# Patient Record
Sex: MALE | Race: OTHER | HISPANIC OR LATINO
[De-identification: names, ages, dates, MRNs, and addresses within clinical notes are randomized per-mention and may not be internally consistent; named-entity substitution may affect disease eponyms.]

---

## 2019-09-12 ENCOUNTER — TRANSCRIPTION ENCOUNTER (OUTPATIENT)
Age: 19
End: 2019-09-12

## 2019-09-18 ENCOUNTER — TRANSCRIPTION ENCOUNTER (OUTPATIENT)
Age: 19
End: 2019-09-18

## 2020-04-26 ENCOUNTER — MESSAGE (OUTPATIENT)
Age: 20
End: 2020-04-26

## 2020-05-02 LAB
SARS-COV-2 IGG SERPL IA-ACNC: <0.1 INDEX
SARS-COV-2 IGG SERPL QL IA: NEGATIVE

## 2022-04-11 PROBLEM — Z00.00 ENCOUNTER FOR PREVENTIVE HEALTH EXAMINATION: Status: ACTIVE | Noted: 2022-04-11

## 2022-04-13 ENCOUNTER — APPOINTMENT (OUTPATIENT)
Dept: PHYSICAL MEDICINE AND REHAB | Facility: CLINIC | Age: 22
End: 2022-04-13
Payer: OTHER MISCELLANEOUS

## 2022-04-13 VITALS — DIASTOLIC BLOOD PRESSURE: 82 MMHG | HEART RATE: 74 BPM | SYSTOLIC BLOOD PRESSURE: 136 MMHG

## 2022-04-13 DIAGNOSIS — Z78.9 OTHER SPECIFIED HEALTH STATUS: ICD-10-CM

## 2022-04-13 PROCEDURE — 99072 ADDL SUPL MATRL&STAF TM PHE: CPT

## 2022-04-13 PROCEDURE — 99203 OFFICE O/P NEW LOW 30 MIN: CPT

## 2022-04-13 RX ORDER — MELOXICAM 7.5 MG/1
7.5 TABLET ORAL
Qty: 28 | Refills: 0 | Status: ACTIVE | COMMUNITY
Start: 2022-04-13 | End: 1900-01-01

## 2022-04-13 NOTE — PHYSICAL EXAM
[FreeTextEntry1] : PE:\par Constitutional: In NAD, calm and cooperative\par MSK (Back)\par 	Inspection: no gross swelling identified\par 	Palpation: No tenderness of the bilateral lower lumbar paraspinals\par 	ROM: Mild pain at end lumbar extension/flexion but AROM is 75 degrees flexion and 15 degrees extension\par 	Strength: 5/5 strength in bilateral lower extremities\par 	Reflexes: 2+ Patella reflex bilaterally, 2+ Achilles reflex bilaterally, negative clonus bilaterally\par 	Sensation: Intact to light touch in bilateral lower extremities\par Special tests:\par SLR:negative bilaterally\par VANDANA:negative bilaterally\par FADIR: negative bilaterally\par Facet loading: positive on left, negative on right

## 2022-04-13 NOTE — ASSESSMENT
[FreeTextEntry1] : Mr. Jc Dickson is a 22 year old male who presents with acute low back pain, likely myofascial in nature, less likely discogenic, after moving a patient at work. Denies any red flag signs. Will recommend:\par - Mobic 7.5mg BID x 1 week, and then PRN thereafter. Patient advised on cardiac/gi/renal side effects. Patient encouraged to take medication with food and not with other NSAIDs. \par - PT 2-3x/week for stretching, strengthening (especially of core muscles), ROM exercises, HEP and modalities PRN including myofascial release, moist heat, and TENS therapy \par - Will defer imaging for now given normal neuro exam\par \par RTC in 4 weeks or sooner if needed. If pain persists, could consider imaging at that time. Patient aware of red flag signs including any changes to their bowel/bladder control, groin numbness or new weakness. Patient knows to seek immediate attention by calling 911 or going to nearest ER if these symptoms appear.

## 2022-04-13 NOTE — HISTORY OF PRESENT ILLNESS
[FreeTextEntry1] : Mr. Jc Dickson is a 22 year old male who presents with back pain. \par \par Location:Across low back into midback and upper back\par Onset:Was moving patient from stretcher to bed on 4/6/22, was pulling patient, felt pain across low back which traveled up to his midback after a few steps. He continued to work at that time but didn't work for 5 days to try to rest but then returned to work as he noticed slight improvement. \par Provocation/Palliative:Worse with bending over and lifting, as well as prolonged walking, better at rest\par Quality: tight, sharp\par Radiation:None\par Severity:4/10\par Timing:Improving somewhat over time\par \par Denies any associated numbness. Denies any associated leg weakness. Denies any loss of bowel/bladder control or any groin numbness.\par Previous medications trialed:Bengay \par Previous procedures relevant to complaint:None\par Conservative therapy tried?:None\par  \par Patient works as patient transporter. Last worked yesterday, still having pain however, although he says he is able to work.

## 2022-05-31 ENCOUNTER — APPOINTMENT (OUTPATIENT)
Dept: PHYSICAL MEDICINE AND REHAB | Facility: CLINIC | Age: 22
End: 2022-05-31
Payer: OTHER MISCELLANEOUS

## 2022-05-31 VITALS
WEIGHT: 205 LBS | HEART RATE: 68 BPM | DIASTOLIC BLOOD PRESSURE: 82 MMHG | TEMPERATURE: 98.2 F | SYSTOLIC BLOOD PRESSURE: 131 MMHG | HEIGHT: 71 IN | BODY MASS INDEX: 28.7 KG/M2 | OXYGEN SATURATION: 97 %

## 2022-05-31 PROCEDURE — 99072 ADDL SUPL MATRL&STAF TM PHE: CPT

## 2022-05-31 PROCEDURE — 99213 OFFICE O/P EST LOW 20 MIN: CPT

## 2022-05-31 NOTE — HISTORY OF PRESENT ILLNESS
[FreeTextEntry1] : Mr. Jc Dickson is a 22 year old  male who presents for follow up. At last visit, he was given Mobic, started on PT and told to consider imaging at next visit. He has been overall doing better, although he has still has episodes of pain. 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 He is only occasionally taking Mobic. He has not done PT but has been doing a HEP at the gym since last visit. \par \par Location:Across low back\par Onset:Was moving patient from stretcher to bed on 4/6/22, was pulling patient, felt pain across low back which traveled up to his midback after a few steps. He continued to work at that time but didn't work for 5 days to try to rest but then returned to work as he noticed slight improvement. \par Provocation/Palliative:Worse with bending over and lifting, as well as prolonged walking, better at rest\par Quality: tight, sharp, soreness\par Radiation:None\par Severity:4/10\par Timing:Improving somewhat over time\par \par No bowel/bladder changes. No groin numbness.

## 2022-05-31 NOTE — ASSESSMENT
[FreeTextEntry1] : Mr. Jc Dickson is a 22 year old male who presents with acute low back pain, likely myofascial in nature, less likely discogenic, after moving a patient at work. Although pain has improved it has been persistent.  Denies any red flag signs. Will recommend:\par - Given persistent pain despite conservative treatment with NSAIDs and 6 weeks of a HEP, will order an MRI L Spine to evaluate for underlying pathology. \par - Continue Mobic 7.5mg QDPRN thereafter. Patient advised on cardiac/gi/renal side effects. Patient encouraged to take medication with food and not with other NSAIDs. \par - He will continue HEP for now, not interested in PT\par \par RTC after MRI. If pain persists, could consider imaging at that time. Patient aware of red flag signs including any changes to their bowel/bladder control, groin numbness or new weakness. Patient knows to seek immediate attention by calling 911 or going to nearest ER if these symptoms appear.

## 2022-05-31 NOTE — PHYSICAL EXAM
[FreeTextEntry1] : PE:\par Constitutional: In NAD, calm and cooperative\par MSK (Back)\par 	Inspection: no gross swelling identified\par 	Palpation: No tenderness of the bilateral lower lumbar paraspinals\par 	ROM: Mild pain at end lumbar flexion>extension but AROM is 75 degrees flexion and 15 degrees extension\par 	Strength: 5/5 strength in bilateral lower extremities\par 	Reflexes: 2+ Patella reflex bilaterally, 2+ Achilles reflex bilaterally, negative clonus bilaterally\par 	Sensation: Intact to light touch in bilateral lower extremities\par Special tests:\par SLR:negative bilaterally\par VANDANA:negative bilaterally\par FADIR: negative bilaterally\par Facet loading: positive on left, negative on right

## 2022-06-01 ENCOUNTER — FORM ENCOUNTER (OUTPATIENT)
Age: 22
End: 2022-06-01

## 2022-06-14 ENCOUNTER — APPOINTMENT (OUTPATIENT)
Dept: PHYSICAL MEDICINE AND REHAB | Facility: CLINIC | Age: 22
End: 2022-06-14
Payer: OTHER MISCELLANEOUS

## 2022-06-14 VITALS
HEIGHT: 71 IN | WEIGHT: 225 LBS | HEART RATE: 61 BPM | SYSTOLIC BLOOD PRESSURE: 125 MMHG | OXYGEN SATURATION: 97 % | TEMPERATURE: 98 F | DIASTOLIC BLOOD PRESSURE: 80 MMHG | BODY MASS INDEX: 31.5 KG/M2

## 2022-06-14 DIAGNOSIS — M54.50 LOW BACK PAIN, UNSPECIFIED: ICD-10-CM

## 2022-06-14 DIAGNOSIS — M54.9 DORSALGIA, UNSPECIFIED: ICD-10-CM

## 2022-06-14 DIAGNOSIS — M79.18 MYALGIA, OTHER SITE: ICD-10-CM

## 2022-06-14 PROCEDURE — 99213 OFFICE O/P EST LOW 20 MIN: CPT

## 2022-06-14 PROCEDURE — 99072 ADDL SUPL MATRL&STAF TM PHE: CPT

## 2022-06-14 NOTE — HISTORY OF PRESENT ILLNESS
[FreeTextEntry1] : Mr. Jc Dickson is a 22 year old  male who presents for follow up. At last visit, he was ordered an MRI L Spine, continued on Mobic and HEP. Since then he has been much better. Denies any pain at this time. Only occasionally with certain maneuvers. Denies any new symptoms. \par \par Previous pain:\par \par Location:Across low back\par Onset:Was moving patient from stretcher to bed on 4/6/22, was pulling patient, felt pain across low back which traveled up to his midback after a few steps. He continued to work at that time but didn't work for 5 days to try to rest but then returned to work as he noticed slight improvement. \par Provocation/Palliative:Worse with bending over and lifting, as well as prolonged walking, better at rest\par Quality: tight, sharp, soreness\par Radiation:None\par Severity:4/10\par Timing:Improving\par \par No bowel/bladder changes. No groin numbness.

## 2022-06-14 NOTE — DATA REVIEWED
[FreeTextEntry1] : PATIENT NAME: Jc Dickson\par PATIENT PHONE NUMBER:\par PATIENT ID: [id number]\par : 2000\par DATE OF EXAM: 2022\par R. Phys. Name: Lavon Langston\par R. Phys. Address: [address] [place] [address]\par R. Phys. Phone: [phone]\par MRI-LUMBAR SPINE NON CONTRAST\par \par HISTORY: Lower back pain. Status post injury in 2022.\par \par TECHNIQUE: MRI of the lumbar spine was performed in a 3.0 Daphne ultra high field\par magnetic resonance imaging unit with sagittal T1-weighted, T2-weighted and STIR\par sequences, as well as axial proton density images through the lumbar disc\par spaces.\par \par COMPARISON: No prior studies available for comparison.\par \par FINDINGS:\par Sagittal images demonstrate straightening of the normal lumbar lordosis.\par \par Vertebral height and marrow signal are normal.\par \par There is mild T2 disc signal loss at L5-S1, associated with mild disc space\par narrowing, which reflects early disc desiccation. The remainder of the lumbar\par disc spaces maintain normal height and signal intensity characteristics.\par \par There is no evidence of disc bulge or herniation from T12-L1 through L4-L5.\par \par At L5-S1, there is right foraminal herniation of the disc, with right foraminal\par encroachment.\par \par The facet joints demonstrate normal alignment. There is no significant facet\par joint arthropathy in the lumbar region. There is edema of the interspinous\par spaces at L3-L4, L4-L5 and L5-S1, which is secondary to injury of the\par interspinous ligaments at these levels.\par \par There is no evidence of central spinal stenosis. The neural foramina are\par bilaterally patent, with the exception of the right foramen at L5-S1, as\par described above.\par \par The conus medullaris is normal in position and appearance.\par \par There are no prevertebral or paraspinal abnormalities.\par \par IMPRESSION:\par \par 1. Right foraminal disc herniation at L5-S1.\par 2. Injury of the interspinous ligaments, L3-L4 through L5-S1.\par 3. Lumbar straightening, which may be related to muscle spasm/pain.\par \par Signed by: Rivera Vasquez MD\par Signed Date: 2022 10:59 AM EDT\par \par SIGNED BY: Rivera Vasquez M.D., Ext. [phone] 10:59 AM

## 2022-06-14 NOTE — PHYSICAL EXAM
[FreeTextEntry1] : PE:\par Constitutional: In NAD, calm and cooperative\par MSK (Back)\par 	Inspection: no gross swelling identified\par 	Palpation: No tenderness of the bilateral lower lumbar paraspinals\par 	ROM: No pain at end lumbar extension/flexion\par 	Strength: 5/5 strength in bilateral lower extremities\par 	Reflexes: 2+ Patella reflex bilaterally, 2+ Achilles reflex bilaterally, negative clonus bilaterally\par 	Sensation: Intact to light touch in bilateral lower extremities\par Special tests:\par SLR:negative bilaterally

## 2022-06-23 ENCOUNTER — FORM ENCOUNTER (OUTPATIENT)
Age: 22
End: 2022-06-23

## 2022-11-22 ENCOUNTER — FORM ENCOUNTER (OUTPATIENT)
Age: 22
End: 2022-11-22

## 2022-11-22 ENCOUNTER — APPOINTMENT (OUTPATIENT)
Dept: ORTHOPEDIC SURGERY | Facility: CLINIC | Age: 22
End: 2022-11-22

## 2022-11-22 VITALS
HEIGHT: 71 IN | SYSTOLIC BLOOD PRESSURE: 109 MMHG | WEIGHT: 215 LBS | HEART RATE: 68 BPM | BODY MASS INDEX: 30.1 KG/M2 | DIASTOLIC BLOOD PRESSURE: 73 MMHG | TEMPERATURE: 98.2 F

## 2022-11-22 PROCEDURE — 73110 X-RAY EXAM OF WRIST: CPT | Mod: LT

## 2022-11-22 PROCEDURE — 99203 OFFICE O/P NEW LOW 30 MIN: CPT

## 2022-11-22 PROCEDURE — 99072 ADDL SUPL MATRL&STAF TM PHE: CPT

## 2022-11-22 NOTE — DISCUSSION/SUMMARY
[de-identified] : The patient has sustained an injury to his left wrist.  By mechanism this is likely due to have been a soft tissue injury.  There is some suggestion that there may have been a bony injury at some point.  The patient will be sent for an MRI for further evaluation of this injury.  He is referred for hand therapy to restore range of motion and strengthening.  He will return following the MRI.

## 2022-11-22 NOTE — PHYSICAL EXAM
[Rad] : radial 2+ and symmetric bilaterally [Normal] : Alert and in no acute distress [Poor Appearance] : well-appearing [Acute Distress] : not in acute distress [Obese] : not obese [de-identified] : The patient has no respiratory distress. Mood and affect are normal. The patient is alert and oriented to person, place and time.\par Examination of the cervical spine demonstrates no tenderness, no deformity and no muscle spasm. Cervical spine rotation is 60° to the right, 60° to the left, 75° of extension and 45° of flexion. Neurologic exam of the upper extremities reveals intact sensation to light touch. Motor function is 5 over 5 in all groups. Deep tendon reflexes are 2+ and equal at the biceps, triceps and brachioradialis.\par Examination of the left shoulder demonstrates no swelling, no deformity and no tenderness. The shoulder is stable. Drop arm test is negative. Oscoda test is negative. Liftoff test is negative. Motor strength is 5 over 5 in all groups. Range of motion is full and identical to that of the right shoulder. Flexion is 160°, abduction 160°, external rotation 45° and internal rotation to the lower thoracic level.  The elbows are stable and nontender.  Examination of the left wrist and hand demonstrates tenderness of the distal radius and distal ulna.  He has painful and limited rotation of the left forearm.  He has left wrist dorsiflexion of 90 degrees compared to 65 on the right.  He has volar flexion of 45 compared to 60 on the right.  Tendon function is intact throughout.  The skin is intact.  There is no lymphedema. [de-identified] : AP, lateral and oblique x-rays of the left hand taken today demonstrate evidence of ulnar styloid fracture and possible evidence of prior distal radius fracture with dorsal angulation.

## 2022-11-22 NOTE — HISTORY OF PRESENT ILLNESS
[de-identified] : 22 year old RHD male  presents for initial evaluation of left wrist pain since 09/12/22. 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 He did not seek any medical attention. He has had increasingly constant sharp and shooting pain in his left wrist worse with any movements of the wrist. He feels clicking with supination. He reports that the past several days he has had pain into the 3rd-5th digits. He feels pain improves with rest, Aleve and heat. Reports an episode of paresthesias along the ulnar aspect of his forearm and wrist. Denies prior injury.

## 2022-11-22 NOTE — REASON FOR VISIT
[Initial Visit] : an initial visit for [Workers' Comp: Date of Injury: _______] : This visit is related to worker's compensation. Date of Injury: [unfilled] [FreeTextEntry2] : left wrist pain

## 2022-12-07 ENCOUNTER — FORM ENCOUNTER (OUTPATIENT)
Age: 22
End: 2022-12-07

## 2023-01-26 ENCOUNTER — APPOINTMENT (OUTPATIENT)
Dept: ORTHOPEDIC SURGERY | Facility: CLINIC | Age: 23
End: 2023-01-26
Payer: OTHER MISCELLANEOUS

## 2023-01-26 PROCEDURE — 99072 ADDL SUPL MATRL&STAF TM PHE: CPT

## 2023-01-26 PROCEDURE — 99214 OFFICE O/P EST MOD 30 MIN: CPT | Mod: 25

## 2023-01-26 PROCEDURE — 73110 X-RAY EXAM OF WRIST: CPT | Mod: LT

## 2023-01-26 PROCEDURE — 29065 APPL CST SHO TO HAND LNG ARM: CPT | Mod: LT

## 2023-01-26 NOTE — PHYSICAL EXAM
[de-identified] : Patient is WDWN, alert, and in no acute distress. Breathing is unlabored. He is grossly oriented to person, place, and time.\par \par Left Wrist:\par No joint stability\par Mild tenderness noted over the anatomical snuffbox\par He has full wrist flexion and extension.\par Full pronation.\par End range supination is limited and he has pain terminally.\par Digital motion is full.\par Sensation is intact to the digits distally. [de-identified] : AP, lateral and oblique views of the LEFT wrist were obtained today and revealed a chronic chip fracture at the tip of the ulnar styloid. There is dorsal angulation of the distal radius. No obvious dislocation of the DRUJ seen. \par \par ------------------------------------------------------------------------------------------------------------------------------------------------------------------------------------ \par \par DATE OF EXAM: 12-\par EXAM:  MRI LEFT WRIST WITHOUT CONTRAST\par IMPRESSION: \par 1.  Healing nondisplaced fracture at the base of the ulnar styloid. Chronic likely fracture fragments distal to the tip of the ulnar styloid.\par 2.  No evidence of an acute or healing distal radial fracture. Dorsal tilt of the distal radial articular surface suggests an old healed fracture however. Clinical correlation suggested.\par 3.  Small DRUJ effusion. Mild dorsal subluxation of the ulna. No visible radioulnar ligament tear. Peripheral TFCC not well visualized related to chronic ossicles and edema.\par  \par Duncan Aguilar MD  - Electronically Signed: 12- 10:45 AM

## 2023-01-26 NOTE — RETURN TO WORK/SCHOOL
[FreeTextEntry1] : Mr. RADHA BOONE was seen in the office today on 01/26/2023 and evaluated by me for an Orthopedic visit. Please be advised that he will remain out of work until further notice.  [FreeTextEntry2] : Dr. Matthew Villela M.D. on 01/26/2023.

## 2023-01-26 NOTE — REASON FOR VISIT
[Initial Visit] : an initial visit for [Workers' Comp: Date of Injury: _______] : This visit is related to worker's compensation. Date of Injury: [unfilled] [FreeTextEntry2] : Left wrist

## 2023-01-26 NOTE — ADDENDUM
[FreeTextEntry1] : I, Rehana Chapman wrote this note acting as a scribe for Dr. Matthew Villela on Jan 26, 2023.  4

## 2023-01-26 NOTE — DISCUSSION/SUMMARY
[de-identified] : The underlying pathophysiology was reviewed with the patient. XR films were reviewed with the patient. Discussed at length the nature of the patient’s condition. The left wrist symptoms appear secondary to DRUJ instability.\par \par At this time, given his persistent pain and complaints and as he has not been properly immobilized since the injury occurred, I recommended immobilization with a long arm cast.\par He was placed into a left long arm [place] cast in the office today on 1/26/23. I recommended 6 weeks of cast immobilization.\par Proper cast care instructions were reviewed.\par He was instructed on ROM exercises of the digits and shoulder while casted.\par \par With regard to work, he will remain out of work until further notice. He was provided with the appropriate note. \par \par All questions answered, understanding verbalized. Patient in agreement with plan of care. Follow up in 6 weeks for cast removal and repeat xrays. He will follow up before then if he has any issues or concerns prior.

## 2023-01-26 NOTE — HISTORY OF PRESENT ILLNESS
[Yes] : The patient is currently working. [de-identified] : 23 year old RHD male,  at [employer], presents for evaluation of left wrist pain since injury on 09/12/22. 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 As a result he felt immediate albeit mild pain and a "pop" in his left wrist. He reports he has been experiencing pain in his left wrist since then. He did not initially get treatment as he states the pain initially was not that bad but persisted. He was seen by Dr. Zelaya in November. Movement worsens the pain. He states he has been doing PT 2x/week for about 1 month, which has helped a little. However, he reports some pain still remains; pain is localized to the ulnar, volar aspects, and more mild in the dorsal aspect of left wrist. He states he is not able to fully supinate yet. He states as it it feels the wrist is subluxing when he moves. \par \par He is still currently working at [employer].  [FreeTextEntry2] : He works in security at [employer].

## 2023-01-26 NOTE — END OF VISIT
[FreeTextEntry3] : All medical record entries made by the Scribe were at my,  Dr. Matthew Villela MD., direction and personally dictated by me on 01/26/2023. I have personally reviewed the chart and agree that the record accurately reflects my personal performance of the history, physical exam, assessment and plan.

## 2023-01-30 ENCOUNTER — APPOINTMENT (OUTPATIENT)
Dept: ORTHOPEDIC SURGERY | Facility: CLINIC | Age: 23
End: 2023-01-30
Payer: OTHER MISCELLANEOUS

## 2023-01-30 PROCEDURE — 99072 ADDL SUPL MATRL&STAF TM PHE: CPT

## 2023-01-30 PROCEDURE — 29705 RMVL/BIVLV FULL ARM/LEG CAST: CPT

## 2023-01-30 PROCEDURE — 99214 OFFICE O/P EST MOD 30 MIN: CPT | Mod: 25

## 2023-01-31 ENCOUNTER — TRANSCRIPTION ENCOUNTER (OUTPATIENT)
Age: 23
End: 2023-01-31

## 2023-01-31 NOTE — REASON FOR VISIT
[Follow-Up Visit] : a follow-up visit for [Workers' Comp: Date of Injury: _______] : This visit is related to worker's compensation. Date of Injury: [unfilled] [FreeTextEntry2] : Left wrist

## 2023-01-31 NOTE — ADDENDUM
[FreeTextEntry1] : I, Rehana Chapman wrote this note acting as a scribe for Dr. Matthew Villela on Jan 30, 2023.

## 2023-01-31 NOTE — HISTORY OF PRESENT ILLNESS
[Yes] : The patient is currently working. [de-identified] : 23 year old RHD male,  at [employer], presents for evaluation of left wrist pain since injury on 09/12/22. 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 As a result he felt immediate albeit mild pain and a "pop" in his left wrist. He reports he has been experiencing pain in his left wrist since then. He did not initially get treatment as he states the pain initially was not that bad but persisted. He was seen by Dr. Zelaya in November. Movement worsens the pain. He states he has been doing PT 2x/week for about 1 month, which has helped a little. However, he reports some pain still remains; pain is localized to the ulnar, volar aspects, and more mild in the dorsal aspect of left wrist. He states he is not able to fully supinate yet. He states as it it feels the wrist is subluxing when he moves. He was seen in the office on 1/26/23 at which time he was casted. He returns 4 days later on 1/30/23 due to complaints of discomfort in the cast. He reports numbness volarly along the forearm, which began about 1 to 2 days ago. \par \par He is still currently working at [employer].  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

## 2023-01-31 NOTE — PHYSICAL EXAM
[de-identified] : Patient is WDWN, alert, and in no acute distress. Breathing is unlabored. He is grossly oriented to person, place, and time.\par \par Left Wrist:\par He presents in a left long arm cast.\par The cast was trimmed proximally due to complaints of irritation and numbness medially to the elbow and volarly at the forearm.\par Digital motion is full.\par Sensation is intact to the digits distally. [de-identified] : AP, lateral and oblique views of the LEFT wrist were obtained on 1/26/23 and revealed a chronic chip fracture at the tip of the ulnar styloid. There is dorsal angulation of the distal radius. No obvious dislocation of the DRUJ seen. \par \par ------------------------------------------------------------------------------------------------------------------------------------------------------------------------------------ \par \par DATE OF EXAM: 12-\par EXAM:  MRI LEFT WRIST WITHOUT CONTRAST\par IMPRESSION: \par 1.  Healing nondisplaced fracture at the base of the ulnar styloid. Chronic likely fracture fragments distal to the tip of the ulnar styloid.\par 2.  No evidence of an acute or healing distal radial fracture. Dorsal tilt of the distal radial articular surface suggests an old healed fracture however. Clinical correlation suggested.\par 3.  Small DRUJ effusion. Mild dorsal subluxation of the ulna. No visible radioulnar ligament tear. Peripheral TFCC not well visualized related to chronic ossicles and edema.\par  \par Duncan Aguilar MD  - Electronically Signed: 12- 10:45 AM

## 2023-01-31 NOTE — DISCUSSION/SUMMARY
[de-identified] : The underlying pathophysiology was reviewed with the patient. XR films were reviewed with the patient. Discussed at length the nature of the patient’s condition. The left wrist symptoms appear secondary to DRUJ instability.\par \par At this time, the cast was trimmed and readjusted. He will continue in the cast for another 5 weeks to complete the full 6 weeks of immobilization. He was instructed on activity modification as needed but I did encourage him to use the hand for ADLs to maintain motion at the digits. \par \par All questions answered, understanding verbalized. Patient in agreement with plan of care. Follow up in 5 weeks for cast removal and repeat xrays. 
NEGATIVE

## 2023-01-31 NOTE — END OF VISIT
[FreeTextEntry3] : All medical record entries made by the Scribe were at my,  Dr. Matthew Villela MD., direction and personally dictated by me on 01/30/2023. I have personally reviewed the chart and agree that the record accurately reflects my personal performance of the history, physical exam, assessment and plan.

## 2023-02-02 RX ORDER — IBUPROFEN 600 MG/1
600 TABLET, FILM COATED ORAL
Qty: 60 | Refills: 0 | Status: ACTIVE | COMMUNITY
Start: 2023-02-02 | End: 1900-01-01

## 2023-03-13 ENCOUNTER — APPOINTMENT (OUTPATIENT)
Dept: ORTHOPEDIC SURGERY | Facility: CLINIC | Age: 23
End: 2023-03-13
Payer: OTHER MISCELLANEOUS

## 2023-03-13 PROCEDURE — 99072 ADDL SUPL MATRL&STAF TM PHE: CPT

## 2023-03-13 PROCEDURE — 73130 X-RAY EXAM OF HAND: CPT | Mod: LT

## 2023-03-13 PROCEDURE — 99213 OFFICE O/P EST LOW 20 MIN: CPT | Mod: 25

## 2023-03-13 PROCEDURE — 29705 RMVL/BIVLV FULL ARM/LEG CAST: CPT | Mod: LT

## 2023-03-13 NOTE — DISCUSSION/SUMMARY
[de-identified] : The underlying pathophysiology was reviewed with the patient. XR films were reviewed with the patient. Discussed at length the nature of the patient’s condition. The left wrist symptoms appear secondary to DRUJ instability.\par \par At this time, the left short arm cast was removed in the office today on 3/13/23. He was instructed to soak the hand in warm water and Epsom salts and begin flexion and extension exercises of the wrist. I recommended he begin a course of hand therapy. He was also instructed on use of a wrist brace for support. He will avoid any soft of heavy lifting as well pushing and pulling.\par The patient wishes to proceed with hand therapy of the left wrist. A script was given.\par \par All questions answered, understanding verbalized. Patient in agreement with plan of care. Follow up in 3 weeks for repeat xrays.

## 2023-03-13 NOTE — ADDENDUM
[FreeTextEntry1] : I, Rehana Chapman wrote this note acting as a scribe for Dr. Matthew Villela on Mar 13, 2023.

## 2023-03-13 NOTE — RETURN TO WORK/SCHOOL
[FreeTextEntry1] : Mr. RADHA BOONE was seen in the office today on 03/13/2023 and evaluated by me for an Orthopedic visit. Please be advised that he will remain out of work for the next three weeks and be reassessed at that time in the office.  [FreeTextEntry2] : Dr. Matthew Villela M.D. on 03/13/2023.

## 2023-03-13 NOTE — END OF VISIT
[FreeTextEntry3] : All medical record entries made by the Scribe were at my,  Dr. Matthew Villela MD., direction and personally dictated by me on 03/13/2023. I have personally reviewed the chart and agree that the record accurately reflects my personal performance of the history, physical exam, assessment and plan.

## 2023-03-13 NOTE — PHYSICAL EXAM
[de-identified] : Patient is WDWN, alert, and in no acute distress. Breathing is unlabored. He is grossly oriented to person, place, and time.\par \par Left Wrist:\par He presents in a left long arm cast, which was removed in the office today on 3/13/23.\par No joint stability\par Mild tenderness noted over the anatomical snuffbox\par He has limitations of wrist flexion and extension due to 6 weeks of cast immobilization.\par Full pronation.\par End range supination is limited.\par Digital motion is full.\par Sensation is intact to the digits distally. [de-identified] : AP, lateral and oblique views of the LEFT wrist were obtained today and revealed a chronic chip fracture at the tip of the ulnar styloid. There is dorsal angulation of the distal radius. No obvious dislocation of the DRUJ seen. \par \par ------------------------------------------------------------------------------------------------------------------------------------------------------------------------------------ \par \par DATE OF EXAM: 12-\par EXAM:  MRI LEFT WRIST WITHOUT CONTRAST\par IMPRESSION: \par 1.  Healing nondisplaced fracture at the base of the ulnar styloid. Chronic likely fracture fragments distal to the tip of the ulnar styloid.\par 2.  No evidence of an acute or healing distal radial fracture. Dorsal tilt of the distal radial articular surface suggests an old healed fracture however. Clinical correlation suggested.\par 3.  Small DRUJ effusion. Mild dorsal subluxation of the ulna. No visible radioulnar ligament tear. Peripheral TFCC not well visualized related to chronic ossicles and edema.\par  \par Duncan Aguilar MD  - Electronically Signed: 12- 10:45 AM

## 2023-03-13 NOTE — HISTORY OF PRESENT ILLNESS
[Yes] : The patient is currently working. [de-identified] : 23 year old RHD male,  at [employer], presents for evaluation of left wrist pain since injury on 09/12/22. 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 As a result he felt immediate albeit mild pain and a "pop" in his left wrist. He reports he has been experiencing pain in his left wrist since then. He did not initially get treatment as he states the pain initially was not that bad but persisted. He was seen by Dr. Zelaya in November. Movement worsens the pain. He states he has been doing PT 2x/week for about 1 month, which has helped a little. However, he reports some pain still remains; pain is localized to the ulnar, volar aspects, and more mild in the dorsal aspect of left wrist. He states he is not able to fully supinate yet. He states as it it feels the wrist is subluxing when he moves. He was seen in the office on 1/26/23 at which time he was casted. He returned 4 days later on 1/30/23 due to complaints of discomfort in the cast. He reported numbness volarly along the forearm, which began about 1 to 2 days prior. He returns for cast removal and repeat xrays on 3/13/23. He notes pain as of the past 2 to 3 weeks while the cast was in place. He reports stiffness and pain once the cast was removed in the office today.\par \par He is still currently working at [employer].  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

## 2023-04-03 ENCOUNTER — APPOINTMENT (OUTPATIENT)
Dept: ORTHOPEDIC SURGERY | Facility: CLINIC | Age: 23
End: 2023-04-03
Payer: OTHER MISCELLANEOUS

## 2023-04-03 PROCEDURE — 99213 OFFICE O/P EST LOW 20 MIN: CPT

## 2023-04-03 PROCEDURE — 73110 X-RAY EXAM OF WRIST: CPT | Mod: LT

## 2023-04-03 NOTE — DISCUSSION/SUMMARY
[de-identified] : The underlying pathophysiology was reviewed with the patient. XR films were reviewed with the patient. Discussed at length the nature of the patient’s condition. The left wrist symptoms appear secondary to DRUJ instability and ECU tendinitis.\par \par At this time, given his residual symptoms, we discussed further treatment options of a cortisone injection at the ECU tendon sheath. He deferred as he stated he would first like to try hand therapy. Worker's compensation authorization will be requested.\par The patient wishes to proceed with hand therapy of the left wrist. A script was given.\par \par WORK STATUS: He is currently not working and will remain out until further notice. He was provided with the appropriate note. \par \par All questions answered, understanding verbalized. Patient in agreement with plan of care. Follow up in 4 weeks for reevaluation. (No xrays needed).

## 2023-04-03 NOTE — END OF VISIT
[FreeTextEntry3] : All medical record entries made by the Scribe were at my,  Dr. Matthew Villela MD., direction and personally dictated by me on 04/03/2023. I have personally reviewed the chart and agree that the record accurately reflects my personal performance of the history, physical exam, assessment and plan.

## 2023-04-03 NOTE — PHYSICAL EXAM
[de-identified] : Patient is WDWN, alert, and in no acute distress. Breathing is unlabored. He is grossly oriented to person, place, and time.\par \par Left Wrist:\par Tenderness over the ECU tendon on exam.\par No joint stability\par Mild tenderness noted over the anatomical snuffbox\par Improved wrist motion.\par Full pronation.\par End range supination is limited.\par Digital motion is full.\par Sensation is intact to the digits distally. [de-identified] : AP, lateral and oblique views of the LEFT wrist were obtained today and revealed a chronic chip fracture at the tip of the ulnar styloid. There is dorsal angulation of the distal radius. No obvious dislocation of the DRUJ seen. \par \par ------------------------------------------------------------------------------------------------------------------------------------------------------------------------------------ \par \par DATE OF EXAM: 12-\par EXAM:  MRI LEFT WRIST WITHOUT CONTRAST\par IMPRESSION: \par 1.  Healing nondisplaced fracture at the base of the ulnar styloid. Chronic likely fracture fragments distal to the tip of the ulnar styloid.\par 2.  No evidence of an acute or healing distal radial fracture. Dorsal tilt of the distal radial articular surface suggests an old healed fracture however. Clinical correlation suggested.\par 3.  Small DRUJ effusion. Mild dorsal subluxation of the ulna. No visible radioulnar ligament tear. Peripheral TFCC not well visualized related to chronic ossicles and edema.\par  \par Duncan Aguilar MD  - Electronically Signed: 12- 10:45 AM

## 2023-04-03 NOTE — RETURN TO WORK/SCHOOL
[FreeTextEntry1] : Mr. RADHA BOONE was seen in the office today on 04/03/2023 and evaluated by me for an Orthopedic visit. Please be advised that he will return to work on limited duty. [FreeTextEntry2] : Dr. Matthew Villela M.D. on 04/03/2023.

## 2023-04-03 NOTE — ADDENDUM
[FreeTextEntry1] : I, Rehana Chapman wrote this note acting as a scribe for Dr. Matthew Villela on Apr 03, 2023.

## 2023-04-03 NOTE — HISTORY OF PRESENT ILLNESS
[Yes] : The patient is currently working. [Has the patient missed work because of the injury/illness?] : The patient has missed work because of the injury/illness. [No] : The patient is currently not working. [de-identified] : 23 year old RHD male,  at [employer], presents for evaluation of left wrist pain since injury on 09/12/22. 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 As a result he felt immediate albeit mild pain and a "pop" in his left wrist. He reports he has been experiencing pain in his left wrist since then. He did not initially get treatment as he states the pain initially was not that bad but persisted. He was seen by Dr. Zelaya in November. Movement worsens the pain. He states he has been doing PT 2x/week for about 1 month, which has helped a little. However, he reports some pain still remains; pain is localized to the ulnar, volar aspects, and more mild in the dorsal aspect of left wrist. He states he is not able to fully supinate yet. He states as it it feels the wrist is subluxing when he moves. \par \par 1/30/23 - He was seen in the office on 1/26/23 at which time he was casted. He returned 4 days later on 1/30/23 due to complaints of discomfort in the cast. He reported numbness volarly along the forearm, which began about 1 to 2 days prior. \par \par 3/13/23 - He returns for cast removal and repeat xrays on 3/13/23. He notes pain as of the past 2 to 3 weeks while the cast was in place. He reports stiffness and pain once the cast was removed in the office today.\par \par 4/3/23 - He presents for repeat xray and is complaining of continued pain. He also notes itchiness to the wrist volarly. He notes he is not in physical therapy as he was told initially that it was an insurance issue but that approval was not requested. 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

## 2023-04-10 ENCOUNTER — NON-APPOINTMENT (OUTPATIENT)
Age: 23
End: 2023-04-10

## 2023-05-01 ENCOUNTER — APPOINTMENT (OUTPATIENT)
Dept: ORTHOPEDIC SURGERY | Facility: CLINIC | Age: 23
End: 2023-05-01
Payer: OTHER MISCELLANEOUS

## 2023-05-01 VITALS — WEIGHT: 225 LBS | BODY MASS INDEX: 31.5 KG/M2 | HEIGHT: 71 IN

## 2023-05-01 PROCEDURE — 99213 OFFICE O/P EST LOW 20 MIN: CPT

## 2023-05-02 NOTE — ADDENDUM
[FreeTextEntry1] : I, Rehana Chapman wrote this note acting as a scribe for Dr. Matthew Villela on May 01, 2023.

## 2023-05-02 NOTE — HISTORY OF PRESENT ILLNESS
[Has the patient missed work because of the injury/illness?] : The patient has missed work because of the injury/illness. [No] : The patient is currently not working. [de-identified] : 23 year old RHD male,  at [employer], presents for evaluation of left wrist pain since injury on 09/12/22. 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 As a result he felt immediate albeit mild pain and a "pop" in his left wrist. He reports he has been experiencing pain in his left wrist since then. He did not initially get treatment as he states the pain initially was not that bad but persisted. He was seen by Dr. Zelaya in November. Movement worsens the pain. He states he has been doing PT 2x/week for about 1 month, which has helped a little. However, he reports some pain still remains; pain is localized to the ulnar, volar aspects, and more mild in the dorsal aspect of left wrist. He states he is not able to fully supinate yet. He states as it it feels the wrist is subluxing when he moves. \par \par 1/30/23 - He was seen in the office on 1/26/23 at which time he was casted. He returned 4 days later on 1/30/23 due to complaints of discomfort in the cast. He reported numbness volarly along the forearm, which began about 1 to 2 days prior. \par \par 3/13/23 - He returns for cast removal and repeat xrays on 3/13/23. He notes pain as of the past 2 to 3 weeks while the cast was in place. He reports stiffness and pain once the cast was removed in the office today.\par \par 4/3/23 - He presents for repeat xray and is complaining of continued pain. He also notes itchiness to the wrist volarly. He notes he is not in physical therapy as he was told initially that it was an insurance issue but that approval was not requested.\par \par 5/1/23 - He returns today in follow up. He notes his pain has remain mostly unchanged and is constant in nature. He still has limitations of end range supination and he has continued pain ulnarly along the ECU tendon. He states he hears and feels a cracking/popping sensation ulnarly along the ulnocarpal joint. He is not in physical therapy as he stated it was not yet approved.  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

## 2023-05-02 NOTE — PHYSICAL EXAM
[de-identified] : Patient is WDWN, alert, and in no acute distress. Breathing is unlabored. He is grossly oriented to person, place, and time.\par \par Left Wrist:\par Tenderness over the ECU tendon on exam.\par No joint instability, with a firm end point on provocative testing.\par No residual tenderness noted over the anatomical snuffbox\par Improved wrist motion.\par Full pronation.\par End range supination is limited, with pain. \par Digital motion is full.\par Sensation is intact to the digits distally. [de-identified] : AP, lateral and oblique views of the LEFT wrist were obtained on 4/3/23 and revealed a chronic chip fracture at the tip of the ulnar styloid. There is dorsal angulation of the distal radius. No obvious dislocation of the DRUJ seen. \par \par ------------------------------------------------------------------------------------------------------------------------------------------------------------------------------------ \par \par DATE OF EXAM: 12-\par EXAM:  MRI LEFT WRIST WITHOUT CONTRAST\par IMPRESSION: \par 1.  Healing nondisplaced fracture at the base of the ulnar styloid. Chronic likely fracture fragments distal to the tip of the ulnar styloid.\par 2.  No evidence of an acute or healing distal radial fracture. Dorsal tilt of the distal radial articular surface suggests an old healed fracture however. Clinical correlation suggested.\par 3.  Small DRUJ effusion. Mild dorsal subluxation of the ulna. No visible radioulnar ligament tear. Peripheral TFCC not well visualized related to chronic ossicles and edema.\par  \par Duncan Aguilar MD  - Electronically Signed: 12- 10:45 AM

## 2023-05-02 NOTE — END OF VISIT
[FreeTextEntry3] : All medical record entries made by the Scribe were at my,  Dr. Matthew Villela MD., direction and personally dictated by me on 05/01/2023. I have personally reviewed the chart and agree that the record accurately reflects my personal performance of the history, physical exam, assessment and plan.

## 2023-05-02 NOTE — DISCUSSION/SUMMARY
[de-identified] : The underlying pathophysiology was reviewed with the patient. XR films were reviewed with the patient. Discussed at length the nature of the patient’s condition. The left wrist symptoms appear secondary to DRUJ instability and ECU tendinitis.\par \par At this time, given his residual symptoms, we discussed further treatment options of operative management. I did tell him that surgery would consist of arthroscopy for exploration and possible debridement, in which case I would refer him to either Dr. Carrillo or Dr. Soto for further care, as I do not perform surgery of this nature. Alternatively, I did tell him that he does have the option to giving things more time and see how he progresses. He stated he is interested in possibly undergoing surgery as he does not feel as if he is getting better and complains of continued pain. He was therefore referred to Dr. Sunday Carrillo for surgical consult. \par The patient wishes to proceed with hand therapy of the left wrist. A script was given.\par \par WORK STATUS: As of his office visit today on 5/1/23, he is not working. He was however provided with a note at the time of his visit today, stating that he may return to work on light duty with restrictions: no lifting greater than 5 lbs and he is prohibited from restraining/holding patients, as he is unable to use his left hand/wrist for any forceful activities that pose a greater risk of injury. \par \par All questions answered, understanding verbalized. Patient in agreement with plan of care. Follow up as needed.

## 2023-05-02 NOTE — RETURN TO WORK/SCHOOL
[FreeTextEntry1] : Mr. RADHA BOONE was seen in the office today on 05/01/2023 and evaluated by me for an Orthopedic visit. Please be advised that he may return to work on limited duty. He is restricted from heavy lifting, greater than 5 lbs. Additionally, he is restricted from holding/restraining patients as he is unable to use his left wrist/hand for any forceful activities that pose greater risk of injury.  [FreeTextEntry2] : Mr. RADHA BOONE was seen in the office today on 05/01/2023 and evaluated by me for an Orthopedic visit. Please be advised that he will

## 2023-05-03 ENCOUNTER — APPOINTMENT (OUTPATIENT)
Dept: ORTHOPEDIC SURGERY | Facility: CLINIC | Age: 23
End: 2023-05-03
Payer: OTHER MISCELLANEOUS

## 2023-05-03 ENCOUNTER — NON-APPOINTMENT (OUTPATIENT)
Age: 23
End: 2023-05-03

## 2023-05-03 VITALS
HEART RATE: 70 BPM | HEIGHT: 71 IN | BODY MASS INDEX: 31.5 KG/M2 | WEIGHT: 225 LBS | DIASTOLIC BLOOD PRESSURE: 70 MMHG | SYSTOLIC BLOOD PRESSURE: 122 MMHG

## 2023-05-03 PROCEDURE — 73110 X-RAY EXAM OF WRIST: CPT | Mod: LT

## 2023-05-03 PROCEDURE — 99214 OFFICE O/P EST MOD 30 MIN: CPT

## 2023-05-03 NOTE — ADDENDUM
[FreeTextEntry1] : I, Rehana Chapman, acted solely as a scribe for Dr. Carrillo on this date on 05/03/2023.

## 2023-05-03 NOTE — PHYSICAL EXAM
[de-identified] : - Constitutional: This is a male in no obvious distress.  \par - Psych: Patient is alert and oriented to person, place and time.  Patient has a normal mood and affect.\par - Cardiovascular: Normal pulses throughout the upper extremities.  No significant varicosities are noted in the upper extremities. \par - Neuro: Strength and sensation are intact throughout the upper extremities.  Patient has normal coordination.\par - Respiratory:  Patient exhibits no evidence of shortness of breath or difficulty breathing.\par - Skin: No rashes, lesions, or other abnormalities are noted in the upper extremities.\par \par --- \par \par Examination of his left wrist and hand demonstrates no obvious swelling.  He has tenderness ulnarly along the TFCC ligament as well as dorsally along the DRUJ.  He does have some laxity to stress testing of the DRUJ, but he does have underlying ligamentous laxity and does have laxity on the asymptomatic right side.  There is clicking ulnarly with pronation supination.  He does have some pain with pronation and supination.  There is mild tenderness along the dorsal wrist capsule.  He has full flexion extension of the digits.  He is neurovascular intact distally. [de-identified] : PA, lateral, oblique and PA  radiographs of the left wrist demonstrate 2 fragments distal to his ulnar styloid which appear chronic in nature consistent with a prior ulnar styloid avulsion fracture.  He also has evidence of an old distal radius fracture that healed with approximately 20 degrees of dorsal angulation on the lateral.  There is no obvious shortening of the distal radius fracture.  There is also some narrowing of the DRUJ.\par \par I reviewed an MRI of his left wrist from 12/22/2022. This demonstrated:\par 1. Healing nondisplaced fracture at the base of the ulnar styloid. Chronic likely fracture fragments distal to the tip of the ulnar styloid.\par 2. No evidence of an acute or healing distal radial fracture. Dorsal tilt of the distal radial articular surface suggests an old healed fracture however. Clinical correlation suggested.\par 3. Small DRUJ effusion. Mild dorsal subluxation of the ulna. No visible radioulnar ligament tear. Peripheral TFCC not well visualized related to chronic ossicles and edema.

## 2023-05-03 NOTE — HISTORY OF PRESENT ILLNESS
[Right] : right hand dominant [Has the patient missed work because of the injury/illness?] : The patient has missed work because of the injury/illness. [No] : The patient is currently not working. [FreeTextEntry1] : \par Workmen's Compensation case\par \par Date of accident: 09/12/2022\par Working: No\par Degree of Disability:\par \par He comes in today for evaluation of a left wrist pain since an injury at work sustained on 09/12/22. 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 He felt immediate pain as well as "pop" in his left wrist but notes at the time the pain was mild. Since, the pain has been constant. He was initially treated in November by Dr. Zelaya and was referred for PT, which he did for about 1 month, with minimal improvement. He began seeing Dr. Villela in January of 2023, who casted him due to DRUJ instability. He was casted for 6 weeks however the pain ulnarly at the wrist has persisted post casting. He localizes his pain ulnarly, along the ECU tendon. He notes continued limitations of end range supination. He was referred for hand therapy however it has not yet been approved by worker's compensation. He presents today for another opinion and to discuss possible arthroscopy.  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

## 2023-05-03 NOTE — DISCUSSION/SUMMARY
[FreeTextEntry1] : He has findings consistent with chronic left ulnar-sided wrist pain, status post an injury at work on 9/12/2022.  He has radiographic evidence of an old distal radius fracture that healed with dorsal angulation as well as an old ulnar styloid avulsion fracture.  Despite the radiographs, he denies a prior injury and was previously asymptomatic.\par \par I had a discussion regarding today's visit, the prognosis of this diagnosis and treatment recommendations and options.  At this time, I reviewed the radiographic and MRI findings in great detail with the patient. I did tell him that he has evidence of prior fractures which healed with some dorsal angulation and predates the injury at work on 9/12/22. I told him that he likely exacerbated the wrist at that time. We discussed further treatment options, consisting of a referral to hand therapy versus a cortisone injection.  I would not recommend surgical intervention at this point in time.\par \par I did tell however tell him that I would first recommend he try hand therapy and if after about 1 month, he notes no improvement, he can return to the office for a cortisone injection. He is in agreement and was provided with the appropriate referral to hand therapy. He will follow up in 4 weeks for reevaluation.\par \par The patient has agreed to this plan of management and has expressed full understanding.  All questions were fully answered to the patient's satisfaction.\par \par My cumulative time spent on this patient's visit included: Preparation for the visit, review of the medical records, review of pertinent diagnostic studies, examination and counseling of the patient on the above diagnosis, treatment plan and prognosis, orders of diagnostic tests, medications and/or appropriate procedures and documentation in the medical records of today's visit.

## 2023-05-03 NOTE — END OF VISIT
[FreeTextEntry3] : This note was written by Rehana Chapman on 05/03/2023 acting solely as a scribe for Dr. Sunday Carrillo.\par  \par All medical record entries made by the Scribe were at my, Dr. Sunday Carrillo, direction and personally dictated by me on 05/03/2023. I have personally reviewed the chart and agree that the record accurately reflects my personal performance of the history, physical exam, assessment and plan.

## 2023-06-05 ENCOUNTER — NON-APPOINTMENT (OUTPATIENT)
Age: 23
End: 2023-06-05

## 2023-06-07 PROBLEM — S63.502A LEFT WRIST SPRAIN: Status: ACTIVE | Noted: 2023-05-03

## 2023-06-07 PROBLEM — S63.592A SPRAIN OF LEFT DISTAL RADIOULNAR JOINT, INITIAL ENCOUNTER: Status: ACTIVE | Noted: 2023-01-26

## 2023-06-14 ENCOUNTER — APPOINTMENT (OUTPATIENT)
Dept: ORTHOPEDIC SURGERY | Facility: CLINIC | Age: 23
End: 2023-06-14
Payer: OTHER MISCELLANEOUS

## 2023-06-14 DIAGNOSIS — S63.592A OTHER SPECIFIED SPRAIN OF LEFT WRIST, INITIAL ENCOUNTER: ICD-10-CM

## 2023-06-14 DIAGNOSIS — S63.502A UNSPECIFIED SPRAIN OF LEFT WRIST, INITIAL ENCOUNTER: ICD-10-CM

## 2023-06-14 PROCEDURE — 99214 OFFICE O/P EST MOD 30 MIN: CPT

## 2023-06-14 NOTE — END OF VISIT
[FreeTextEntry3] : This note was written by Rehana Chapman on 06/14/2023 acting solely as a scribe for Dr. Sunday Carrillo.\par  \par All medical record entries made by the Scribe were at my, Dr. Sunday Carrillo, direction and personally dictated by me on 06/14/2023. I have personally reviewed the chart and agree that the record accurately reflects my personal performance of the history, physical exam, assessment and plan.

## 2023-06-14 NOTE — DISCUSSION/SUMMARY
[FreeTextEntry1] : I had a discussion regarding today's visit, the diagnosis and treatment recommendations and options.  We also discussed changes since the last visit.  At this time, I again did tell him that he has evidence of a prior distal radius fracture which healed with a malunion, in addition to 2 fragments distal to the ulnar styloid secondary to a prior fracture.  I did tell him that based upon the radiographs, his distal radius fracture predates the injury at work on 9/12/22. I told him that he likely exacerbated the wrist at that time. \par \par Given his persistent pain and associated symptoms, we discussed treatment options of continued hand therapy, a cortisone injection at the left wrist ulno-carpal joint, and ultimately surgery which I told him could consist of a corrective distal radius osteotomy.  However I am unsure if this procedure is even indicated. He deferred a cortisone injection at the left wrist ulno-carpal joint given he stated he does not believe it to be a long term solution. I told him with surgery, a corrective osteotomy is an involved operation with a prolonged recovery. Furthermore, I again explained to him that I am unsure that this is even the correct procedure and if it will even aide in relief and/or resolution of his symptoms.\par \par He again told me that he did not believe me that he has a prior fracture and he states that his symptoms are secondary to the injury on 1/9/2022.  I told him that I respectfully do not agree.  Because of this, as he does not agree with my interpretation of his x-rays and of there being a prior injury, I recommended he see another physician in consultation.  He will follow-up with me on an as-needed basis.\par \par The patient has agreed to the above plan of management and has expressed full understanding.  All questions were fully answered to the patient's satisfaction.\par \par My cumulative time spent on today's visit was greater than 30 minutes and included: Preparation for the visit, review of the medical records, review of pertinent diagnostic studies, examination and counseling of the patient on the above diagnosis, treatment plan and prognosis, orders of diagnostic tests, medications and/or appropriate procedures and documentation in the medical records of today's visit.

## 2023-06-14 NOTE — ADDENDUM
[FreeTextEntry1] : I, Rehana Chapman, acted solely as a scribe for Dr. Carrillo on this date on 06/14/2023.

## 2023-06-14 NOTE — PHYSICAL EXAM
[de-identified] : - Constitutional: This is a male in no obvious distress.  \par - Psych: Patient is alert and oriented to person, place and time.  Patient has a normal mood and affect.\par - Cardiovascular: Normal pulses throughout the upper extremities.  No significant varicosities are noted in the upper extremities. \par - Neuro: Strength and sensation are intact throughout the upper extremities.  Patient has normal coordination.\par - Respiratory:  Patient exhibits no evidence of shortness of breath or difficulty breathing.\par - Skin: No rashes, lesions, or other abnormalities are noted in the upper extremities.\par \par --- \par \par Examination of his left wrist and hand demonstrates no obvious swelling.  He has tenderness ulnarly along the TFCC ligament as well as dorsally along the DRUJ.  He does have some laxity to stress testing of the DRUJ, but he does have underlying ligamentous laxity and does have laxity on the asymptomatic right side.  There is clicking ulnarly with pronation supination.  He does have some pain with pronation and supination.  There is mild tenderness along the dorsal wrist capsule.  He has full flexion extension of the digits.  He is neurovascular intact distally. [de-identified] : PA, lateral, oblique and PA  radiographs of the left wrist dated 5/3/2023 demonstrated 2 fragments distal to his ulnar styloid which appear chronic in nature consistent with a prior ulnar styloid avulsion fracture.  He also has evidence of an old distal radius fracture that healed with approximately 20 degrees of dorsal angulation on the lateral.  There is no obvious shortening of the distal radius fracture.  There is also some narrowing of the DRUJ.\par \par An MRI of his left wrist from 12/22/2022 demonstrated:\par 1. Healing nondisplaced fracture at the base of the ulnar styloid. Chronic likely fracture fragments distal to the tip of the ulnar styloid.\par 2. No evidence of an acute or healing distal radial fracture. Dorsal tilt of the distal radial articular surface suggests an old healed fracture however. Clinical correlation suggested.\par 3. Small DRUJ effusion. Mild dorsal subluxation of the ulna. No visible radioulnar ligament tear. Peripheral TFCC not well visualized related to chronic ossicles and edema.

## 2023-06-14 NOTE — HISTORY OF PRESENT ILLNESS
[FreeTextEntry1] : \par Workmen's Compensation case\par \par Date of accident: 09/12/2022\par Working: No\par Degree of Disability: 20%\par \par Follow-up regarding chronic left ulnar-sided wrist pain, status post an injury at work on 9/12/2022.  He has radiographic evidence of an old distal radius fracture that healed with dorsal angulation as well as an old ulnar styloid avulsion fracture.\par \par See note from when he was seen in the office 6 weeks ago.  He was referred to hand therapy and we discussed a possible cortisone injection if he did not improve.\par \par He returns today and notes very minor improvement in strength and his function to perform activities of daily living. He has continued with hand therapy, which he is in twice weekly. He however notes continued symptoms, which are worse at night. He rates his pain as a 7 out of 10 at this time.  [FreeTextEntry2] : He works in security at [employer].

## 2023-06-14 NOTE — RETURN TO WORK/SCHOOL
[FreeTextEntry1] : Please be advised that patient may return to work on a limited duty basis. Patient is restricted from heavy lifting pushing or pulling no greater than 10 lbs, patient is not to perform any strenuous activity and is on a 25% disability. Additionally he is restricted from holding and/or restraining patient due to inability to use his left hand/wrist due to risks of greater injury.

## 2023-06-15 ENCOUNTER — APPOINTMENT (OUTPATIENT)
Dept: ORTHOPEDIC SURGERY | Facility: CLINIC | Age: 23
End: 2023-06-15
Payer: OTHER MISCELLANEOUS

## 2023-06-15 PROCEDURE — 99214 OFFICE O/P EST MOD 30 MIN: CPT | Mod: 25

## 2023-07-10 ENCOUNTER — APPOINTMENT (OUTPATIENT)
Dept: ORTHOPEDIC SURGERY | Facility: CLINIC | Age: 23
End: 2023-07-10
Payer: OTHER MISCELLANEOUS

## 2023-07-10 ENCOUNTER — FORM ENCOUNTER (OUTPATIENT)
Age: 23
End: 2023-07-10

## 2023-07-10 VITALS — WEIGHT: 225 LBS | HEIGHT: 71 IN | BODY MASS INDEX: 31.5 KG/M2

## 2023-07-10 DIAGNOSIS — S69.92XA UNSPECIFIED INJURY OF LEFT WRIST, HAND AND FINGER(S), INITIAL ENCOUNTER: ICD-10-CM

## 2023-07-10 PROCEDURE — 99213 OFFICE O/P EST LOW 20 MIN: CPT

## 2023-08-08 ENCOUNTER — APPOINTMENT (OUTPATIENT)
Dept: ORTHOPEDIC SURGERY | Facility: CLINIC | Age: 23
End: 2023-08-08

## 2023-09-08 ENCOUNTER — EMERGENCY (EMERGENCY)
Facility: HOSPITAL | Age: 23
LOS: 0 days | Discharge: HOME | End: 2023-09-08
Attending: EMERGENCY MEDICINE
Payer: COMMERCIAL

## 2023-09-08 VITALS
TEMPERATURE: 98 F | HEART RATE: 70 BPM | DIASTOLIC BLOOD PRESSURE: 96 MMHG | OXYGEN SATURATION: 98 % | HEIGHT: 71 IN | WEIGHT: 210.98 LBS | RESPIRATION RATE: 18 BRPM | SYSTOLIC BLOOD PRESSURE: 136 MMHG

## 2023-09-08 DIAGNOSIS — S40.871A OTHER SUPERFICIAL BITE OF RIGHT UPPER ARM, INITIAL ENCOUNTER: ICD-10-CM
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
## 2023-09-08 PROCEDURE — 99284 EMERGENCY DEPT VISIT MOD MDM: CPT

## 2023-09-08 PROCEDURE — 99283 EMERGENCY DEPT VISIT LOW MDM: CPT

## 2023-09-08 RX ADMIN — Medication 1 TABLET(S): at 22:44
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
## 2023-09-08 NOTE — ED PROVIDER NOTE - NSFOLLOWUPINSTRUCTIONS_ED_ALL_ED_FT
Human Bite    WHAT YOU NEED TO KNOW:    What should I know about a human bite? A human bite is any wound that you get from coming into contact with a person's teeth. The wound may be deep and cause injury to bones, muscles, and other body parts. Human bites are often more serious than animal bites. Wounds are more likely to become infected because of the germs in a person's mouth.    What are the signs and symptoms of a human bite? You may have the following around the bite area:    Cuts, bruises, or swelling    Bleeding or pus    Redness, tenderness, and warmth around the wound    Difficulty moving the wounded area or deformed skin    Fever  How is a human bite diagnosed? Your healthcare provider will look closely at the injury, including the area around it. Your provider will check to see if the skin is broken and how deep the wound is. Your provider will also look for other problems or signs of infection. Your provider may check your health history, including other illnesses, medicines you take, and past surgeries. Tell your provider which vaccinations you have received, such as tetanus and hepatitis B. Tell your provider when and how you were bitten. You may have any of the following:    Wound culture: This is a test to grow and identify the germs that may be in your wound. This helps healthcare providers find out if you have an infection and what medicine is best to treat it.    Blood tests: Your healthcare provider may order these tests to check for an infection.    X-ray: This is a picture of your bones and tissues in the bite wound area. Healthcare providers use the pictures to look for broken bones, injury, or foreign objects.  How is a human bite treated? Treatment will depend on how severe the wound is, its location, and whether other areas are affected. It may also depend on the length of time you have had the injury. You may need any of the following treatments:    Clean the wound: The wound will be cleaned with soap and water or antibacterial solution. This helps wash away germs and decrease the chances of infection. Flushing with clean water further cleans the wound. This is done under high pressure, using a needle or catheter tip and large syringe. Objects, dirt, or dead tissues from the open wound will be removed.    Medicines: Your healthcare provider may give you antibiotic medicine to fight infection. You may also be given medicine to ease your symptoms, such as pain, swelling, and fever. Tetanus shots, antivirals, and immune globulins may be also be given.    Stitches or surgery: Your wound may be left open until it heals or it may be closed with stitches. You may need surgery to repair a broken bone or damaged joint, tendon, or nerve. Rarely, you may need surgery to rebuild the body part with the bite wound.  How should I care for my wound?    Rinse the bitten area with water. Clean it with mild soap and water to prevent infection.    Use a clean cloth to apply direct pressure to the wound. Do this to stop any bleeding.    Keep the injured area still to decrease pain. This may be done with a splint and bandage.    Sit or lie so the bite area is raised above your heart. This will decrease swelling. Put pillows under an injured leg when lying in bed. A sling may be used if your arm or hand is injured.  What are the risks of a human bite? Medicines used to treat a human bite may cause nausea or vomiting. You may develop soreness, redness, or swelling where a tetanus shot was given. Untreated human bites may lead to more serious problems, such as swelling and infections. Severe swelling may keep prevent blood from flowing to your hands and feet. Infection may spread to other parts of your body and this can become life-threatening.    When should I contact my healthcare provider?    You have a fever.    You have a skin rash, itching, or swelling after taking your medicine.    You have numbness or tingling in the area of the bite.    You have pain or problems moving the injured area or get tender lumps in the groin or armpits.    You have questions or concerns about your condition or care.  When should I seek immediate care?    You have trouble swallowing and your jaw and neck are stiff.    You have trouble talking, walking, or breathing.    You have increased redness, numbness, or swelling in the bitten area.    Your wound does not stop bleeding even after you apply pressure.    Your pain is the same or worse even after taking medicine.    Your wound or bandage has pus or a bad smell, even if you clean it every day.  CARE AGREEMENT:    You have the right to help plan your care. Learn about your health condition and how it may be treated. Discuss treatment options with your healthcare providers to decide what care you want to receive. You always have the right to refuse treatment.    © [employer] 1973, 2023    	  back to top            © [employer] 1973, 2023
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
## 2023-09-08 NOTE — ED PROVIDER NOTE - PATIENT PORTAL LINK FT
You can access the FollowMyHealth Patient Portal offered by [facility] by registering at the following website: [email or web address]. By joining [facility]’s FollowMyHealth portal, you will also be able to view your health information using other applications (apps) compatible with our system.

## 2023-10-17 ENCOUNTER — APPOINTMENT (OUTPATIENT)
Dept: ULTRASOUND IMAGING | Facility: CLINIC | Age: 23
End: 2023-10-17
Payer: OTHER MISCELLANEOUS

## 2023-10-17 ENCOUNTER — RESULT REVIEW (OUTPATIENT)
Age: 23
End: 2023-10-17

## 2023-10-17 ENCOUNTER — OUTPATIENT (OUTPATIENT)
Dept: OUTPATIENT SERVICES | Facility: HOSPITAL | Age: 23
LOS: 1 days | End: 2023-10-17
Payer: COMMERCIAL

## 2023-10-17 DIAGNOSIS — M65.842 OTHER SYNOVITIS AND TENOSYNOVITIS, LEFT HAND: ICD-10-CM

## 2023-10-17 PROCEDURE — 20604 DRAIN/INJ JOINT/BURSA W/US: CPT

## 2023-10-17 PROCEDURE — 20604 DRAIN/INJ JOINT/BURSA W/US: CPT | Mod: LT

## 2024-07-17 ENCOUNTER — RESULT REVIEW (OUTPATIENT)
Age: 24
End: 2024-07-17

## 2024-07-17 ENCOUNTER — OUTPATIENT (OUTPATIENT)
Dept: OUTPATIENT SERVICES | Facility: HOSPITAL | Age: 24
LOS: 1 days | End: 2024-07-17
Payer: COMMERCIAL

## 2024-07-17 ENCOUNTER — APPOINTMENT (OUTPATIENT)
Dept: ULTRASOUND IMAGING | Facility: CLINIC | Age: 24
End: 2024-07-17
Payer: OTHER MISCELLANEOUS

## 2024-07-17 DIAGNOSIS — Z47.89 ENCOUNTER FOR OTHER ORTHOPEDIC AFTERCARE: ICD-10-CM

## 2024-07-17 PROCEDURE — 76882 US LMTD JT/FCL EVL NVASC XTR: CPT | Mod: 26,LT

## 2024-07-17 PROCEDURE — 76882 US LMTD JT/FCL EVL NVASC XTR: CPT

## 2025-07-02 NOTE — ED ADULT NURSE NOTE - EXTENSIONS OF SELF_ADULT
Called patient to get a clinical update as he was instructed to follow up as needed, and scheduled a follow up for some pain. Will await return call/try again.Appointment 7/15   None